# Patient Record
Sex: FEMALE | Race: ASIAN | NOT HISPANIC OR LATINO | Employment: UNEMPLOYED | URBAN - METROPOLITAN AREA
[De-identification: names, ages, dates, MRNs, and addresses within clinical notes are randomized per-mention and may not be internally consistent; named-entity substitution may affect disease eponyms.]

---

## 2024-02-10 ENCOUNTER — APPOINTMENT (OUTPATIENT)
Dept: URGENT CARE | Facility: CLINIC | Age: 69
End: 2024-02-10

## 2024-02-10 ENCOUNTER — OFFICE VISIT (OUTPATIENT)
Dept: URGENT CARE | Facility: CLINIC | Age: 69
End: 2024-02-10

## 2024-02-10 VITALS
TEMPERATURE: 98.5 F | DIASTOLIC BLOOD PRESSURE: 74 MMHG | SYSTOLIC BLOOD PRESSURE: 118 MMHG | HEIGHT: 60 IN | HEART RATE: 75 BPM | WEIGHT: 123 LBS | RESPIRATION RATE: 18 BRPM | OXYGEN SATURATION: 96 % | BODY MASS INDEX: 24.15 KG/M2

## 2024-02-10 DIAGNOSIS — J02.9 SORE THROAT: ICD-10-CM

## 2024-02-10 PROCEDURE — 99202 OFFICE O/P NEW SF 15 MIN: CPT | Performed by: NURSE PRACTITIONER

## 2024-02-10 PROCEDURE — 3078F DIAST BP <80 MM HG: CPT | Performed by: NURSE PRACTITIONER

## 2024-02-10 PROCEDURE — 3074F SYST BP LT 130 MM HG: CPT | Performed by: NURSE PRACTITIONER

## 2024-02-10 RX ORDER — DEXAMETHASONE SODIUM PHOSPHATE 10 MG/ML
10 INJECTION INTRAMUSCULAR; INTRAVENOUS ONCE
Status: DISCONTINUED | OUTPATIENT
Start: 2024-02-10 | End: 2024-02-10

## 2024-02-10 RX ORDER — RISPERIDONE 2 MG/1
TABLET ORAL
COMMUNITY
Start: 2023-12-16

## 2024-02-10 RX ORDER — RISPERIDONE 3 MG/1
3 TABLET ORAL EVERY EVENING
COMMUNITY
Start: 2023-11-14 | End: 2024-02-10

## 2024-02-11 NOTE — PROGRESS NOTES
Shauna Zelaya is a 68 y.o. female who presents for Sore Throat (X2-3 weeks, swollen tonsils, feels like something stuck in throat )    Accompanied by her daughter today.  HPI  This is a new problem. Shauna Zelaya is a 68 y.o. patient who presents to urgent care with c/o: Sore throat for 2 to 3 weeks.  She feels like her tonsils are swollen.  She has pain with swallowing.  She denies fever, nasal congestion, cough.  The sore throat gets a little better but always comes back again  Treatments tried none.  No other aggravating or alleviating factors.    ROS See HPI    Allergies:       Allergies   Allergen Reactions    Penicillins Swelling       PMSFS Hx:  History reviewed. No pertinent past medical history.  History reviewed. No pertinent surgical history.  History reviewed. No pertinent family history.  Social History     Tobacco Use    Smoking status: Never    Smokeless tobacco: Never   Substance Use Topics    Alcohol use: Not Currently       Problems:   There is no problem list on file for this patient.      Medications:   Current Outpatient Medications on File Prior to Visit   Medication Sig Dispense Refill    risperiDONE (RISPERDAL) 2 MG Tab TAKE 1 TABLET BY MOUTH EVERY DAY AT NIGHT       No current facility-administered medications on file prior to visit.        Objective:     /74   Pulse 75   Temp 36.9 °C (98.5 °F) (Temporal)   Resp 18   Ht 1.524 m (5')   Wt 55.8 kg (123 lb)   SpO2 96%   BMI 24.02 kg/m²     Physical Exam  Vitals reviewed.   Constitutional:       Appearance: Normal appearance. She is normal weight.   HENT:      Mouth/Throat:      Pharynx: Posterior oropharyngeal erythema (Mild in posterior pharynx.) present.   Neck:      Trachea: Trachea and phonation normal.   Cardiovascular:      Rate and Rhythm: Normal rate and regular rhythm.      Pulses: Normal pulses.      Heart sounds: Normal heart sounds.   Pulmonary:      Effort: Pulmonary effort is normal.      Breath sounds: Normal breath sounds.    Musculoskeletal:      Cervical back: Neck supple.   Lymphadenopathy:      Head:      Right side of head: No tonsillar adenopathy.      Left side of head: No tonsillar adenopathy.      Cervical: No cervical adenopathy.   Skin:     General: Skin is warm.      Capillary Refill: Capillary refill takes less than 2 seconds.   Neurological:      Mental Status: She is alert.         Assessment /Associated Orders:      1. Sore throat  POCT CEPHEID GROUP A STREP - PCR          Medical Decision Making:    Shauna is a very pleasant 68 y.o. female who is clinically stable at today's acute urgent care visit.  No acute distress noted.  VSS. Appropriate for outpatient care at this time.   Acute problem today with uncertain prognosis.   Pt suddenly got up and left examination room without oral decadron or swab.   Dtr says that she wants her mom treated for strep throat without testing. No cervical lymphadenopathy, fever or swollen tonsils.    Educated that etiology of her sore throat could be viral , allergen or bacterial and that if she feels her mom has strep throat we should test.             Please note that this dictation was created using voice recognition software. I have worked with consultants from the vendor as well as technical experts from [facility] to optimize the interface. I have made every reasonable attempt to correct obvious errors, but I expect that there are errors of grammar and possibly content that I did not discover before finalizing the note.  This note was electronically signed by provider